# Patient Record
Sex: FEMALE | Race: WHITE | HISPANIC OR LATINO | URBAN - METROPOLITAN AREA
[De-identification: names, ages, dates, MRNs, and addresses within clinical notes are randomized per-mention and may not be internally consistent; named-entity substitution may affect disease eponyms.]

---

## 2017-11-10 ENCOUNTER — EMERGENCY (EMERGENCY)
Facility: HOSPITAL | Age: 42
LOS: 1 days | Discharge: HOME | End: 2017-11-10
Attending: EMERGENCY MEDICINE
Payer: SELF-PAY

## 2017-11-10 VITALS
TEMPERATURE: 98 F | RESPIRATION RATE: 18 BRPM | HEART RATE: 78 BPM | DIASTOLIC BLOOD PRESSURE: 63 MMHG | OXYGEN SATURATION: 100 % | SYSTOLIC BLOOD PRESSURE: 107 MMHG

## 2017-11-10 VITALS
OXYGEN SATURATION: 99 % | RESPIRATION RATE: 18 BRPM | SYSTOLIC BLOOD PRESSURE: 132 MMHG | HEART RATE: 90 BPM | TEMPERATURE: 97 F | DIASTOLIC BLOOD PRESSURE: 76 MMHG

## 2017-11-10 VITALS
RESPIRATION RATE: 18 BRPM | DIASTOLIC BLOOD PRESSURE: 94 MMHG | TEMPERATURE: 98 F | WEIGHT: 162.04 LBS | HEIGHT: 64 IN | SYSTOLIC BLOOD PRESSURE: 165 MMHG | OXYGEN SATURATION: 98 % | HEART RATE: 108 BPM

## 2017-11-10 VITALS
OXYGEN SATURATION: 100 % | HEART RATE: 108 BPM | WEIGHT: 160.06 LBS | RESPIRATION RATE: 16 BRPM | TEMPERATURE: 98 F | DIASTOLIC BLOOD PRESSURE: 51 MMHG | SYSTOLIC BLOOD PRESSURE: 100 MMHG | HEIGHT: 69 IN

## 2017-11-10 PROCEDURE — 93005 ELECTROCARDIOGRAM TRACING: CPT

## 2017-11-10 PROCEDURE — 99285 EMERGENCY DEPT VISIT HI MDM: CPT | Mod: 25

## 2017-11-10 PROCEDURE — 82962 GLUCOSE BLOOD TEST: CPT

## 2017-11-10 PROCEDURE — 99284 EMERGENCY DEPT VISIT MOD MDM: CPT

## 2017-11-10 PROCEDURE — 99283 EMERGENCY DEPT VISIT LOW MDM: CPT

## 2017-11-10 RX ORDER — LAMOTRIGINE 25 MG/1
600 TABLET, ORALLY DISINTEGRATING ORAL ONCE
Qty: 0 | Refills: 0 | Status: DISCONTINUED | OUTPATIENT
Start: 2017-11-10 | End: 2017-11-10

## 2017-11-10 RX ORDER — OXCARBAZEPINE 300 MG/1
300 TABLET, FILM COATED ORAL ONCE
Qty: 0 | Refills: 0 | Status: COMPLETED | OUTPATIENT
Start: 2017-11-10 | End: 2017-11-10

## 2017-11-10 RX ADMIN — OXCARBAZEPINE 300 MILLIGRAM(S): 300 TABLET, FILM COATED ORAL at 10:58

## 2017-11-10 NOTE — ED PROVIDER NOTE - PHYSICAL EXAMINATION
Small abrasion to right side of tongue. awake alert patient, no other evidence of trauma. no incontinence.

## 2017-11-10 NOTE — ED PROVIDER NOTE - OBJECTIVE STATEMENT
patient with seizure disorder for 10 years with full workup on trileptal 300mg bid here with breakthrough seizure this morning. patient states gets seizures 1-2 times per month, has a neurologist, and an appointment with him on 11/15. patient states she was upset last night due to problems with older daughter and that her seizures are often brought on by being upset. patient doesn't drink patient with seizure disorder for 10 years with full workup on trileptal 300mg bid here with breakthrough seizure this morning. patient states gets seizures 1-2 times per month, has a neurologist, and an appointment with him on 11/15. patient states she was upset last night due to problems with older daughter and that her seizures are often brought on by being upset. patient doesn't drink alcohol or drive. Today was doing laundry in laundry room and had a tonic clonic seizure.

## 2017-11-10 NOTE — ED PROVIDER NOTE - MEDICAL DECISION MAKING DETAILS
40 y/o F pt with second seizure episode this morning. Will give seizure medication and discharge home.

## 2017-11-10 NOTE — ED PROVIDER NOTE - CHPI ED SYMPTOMS NEG
normal...
no fever, no chills, no shortness of breath, no cough, no chest pain, no palpitations, no nausea, no vomiting, no diarrhea, no abd pain, no dysuria, no urinary frequency, no hematuria, no urinary/bowel incontinence, no tongue laceration

## 2017-11-10 NOTE — ED ADULT NURSE NOTE - OBJECTIVE STATEMENT
Patient came to the ED a/o x 3 ambulates BIBA for seizure episode in the street. Patient states she was recently discharge and had seizure while going home.

## 2017-11-10 NOTE — ED PROVIDER NOTE - MEDICAL DECISION MAKING DETAILS
patient with breakthrough seizure. patient declined Emergency Department workup, declined bloodwork stating that this is just one of her usual breakthrough seizures and she will followup with her neurologist. awake alert and oriented on discharge.

## 2017-11-10 NOTE — ED PROVIDER NOTE - OBJECTIVE STATEMENT
42 y/o F pt with PMHx of seizure disorder (on Lamictal 600mg BID), presents to ED c/o second seizure x this morning. Pt was just discharged this morning for same complaint of an seizure episode earlier this morning. Pt reports she normally gets seizures 2-3 times a month. Pt notes she has some stress at home. Pt denies fever, chills, shortness of breath, cough, chest pain, palpitations, nausea, vomiting, diarrhea, abd pain, no dysuria, no urinary frequency, no hematuria, no urinary/bowel incontinence, no tongue laceration, or any other complaints. NKDA. 40 y/o F pt with PMHx of seizure disorder (on Trileptal 600mg BID), presents to ED c/o second seizure x this morning. Pt was just discharged this morning for same complaint of an seizure episode earlier this morning. Pt reports she normally gets seizures 2-3 times a month. Pt notes she has some stress at home. Pt denies fever, chills, shortness of breath, cough, chest pain, palpitations, nausea, vomiting, diarrhea, abd pain, no dysuria, no urinary frequency, no hematuria, no urinary/bowel incontinence, no tongue laceration, or any other complaints. NKDA.